# Patient Record
Sex: MALE | Race: WHITE | NOT HISPANIC OR LATINO | Employment: OTHER | URBAN - METROPOLITAN AREA
[De-identification: names, ages, dates, MRNs, and addresses within clinical notes are randomized per-mention and may not be internally consistent; named-entity substitution may affect disease eponyms.]

---

## 2023-01-03 ENCOUNTER — HOSPITAL ENCOUNTER (EMERGENCY)
Facility: HOSPITAL | Age: 73
Discharge: HOME | End: 2023-01-03
Attending: EMERGENCY MEDICINE | Admitting: EMERGENCY MEDICINE
Payer: MEDICARE

## 2023-01-03 VITALS
SYSTOLIC BLOOD PRESSURE: 144 MMHG | RESPIRATION RATE: 18 BRPM | WEIGHT: 190 LBS | DIASTOLIC BLOOD PRESSURE: 74 MMHG | HEART RATE: 90 BPM | HEIGHT: 70 IN | TEMPERATURE: 99.7 F | OXYGEN SATURATION: 96 % | BODY MASS INDEX: 27.2 KG/M2

## 2023-01-03 DIAGNOSIS — U07.1 COVID-19: Primary | ICD-10-CM

## 2023-01-03 LAB
ANION GAP SERPL CALCULATED.3IONS-SCNC: 9 MMOL/L (ref 5–15)
BUN SERPL-MCNC: 18 MG/DL (ref 8–23)
BUN/CREAT SERPL: 24 (ref 7–25)
CALCIUM SPEC-SCNC: 8.4 MG/DL (ref 8.6–10.5)
CHLORIDE SERPL-SCNC: 106 MMOL/L (ref 98–107)
CO2 SERPL-SCNC: 23 MMOL/L (ref 22–29)
CREAT SERPL-MCNC: 0.75 MG/DL (ref 0.76–1.27)
EGFRCR SERPLBLD CKD-EPI 2021: 94.7 ML/MIN/1.73
FLUAV SUBTYP SPEC NAA+PROBE: NOT DETECTED
FLUBV RNA ISLT QL NAA+PROBE: NOT DETECTED
GLUCOSE SERPL-MCNC: 124 MG/DL (ref 65–99)
POTASSIUM SERPL-SCNC: 3.9 MMOL/L (ref 3.5–5.2)
S PYO AG THROAT QL: NEGATIVE
SARS-COV-2 RNA PNL SPEC NAA+PROBE: DETECTED
SODIUM SERPL-SCNC: 138 MMOL/L (ref 136–145)

## 2023-01-03 PROCEDURE — 99283 EMERGENCY DEPT VISIT LOW MDM: CPT

## 2023-01-03 PROCEDURE — 87880 STREP A ASSAY W/OPTIC: CPT | Performed by: EMERGENCY MEDICINE

## 2023-01-03 PROCEDURE — 87081 CULTURE SCREEN ONLY: CPT | Performed by: EMERGENCY MEDICINE

## 2023-01-03 PROCEDURE — 80048 BASIC METABOLIC PNL TOTAL CA: CPT | Performed by: EMERGENCY MEDICINE

## 2023-01-03 PROCEDURE — 36415 COLL VENOUS BLD VENIPUNCTURE: CPT

## 2023-01-03 PROCEDURE — 87636 SARSCOV2 & INF A&B AMP PRB: CPT | Performed by: EMERGENCY MEDICINE

## 2023-01-03 PROCEDURE — C9803 HOPD COVID-19 SPEC COLLECT: HCPCS

## 2023-01-03 RX ORDER — ACETAMINOPHEN 500 MG
1000 TABLET ORAL ONCE
Status: COMPLETED | OUTPATIENT
Start: 2023-01-03 | End: 2023-01-03

## 2023-01-03 RX ADMIN — ACETAMINOPHEN 1000 MG: 500 TABLET ORAL at 01:40

## 2023-01-03 NOTE — ED NOTES
Pt c/o sore throat ongoing for 2 days, increasing in severity. Pt also reports taking a home covid test that came  back positive. Pt denies any known sick contacts.    Pt and staff in appropraite ppe.

## 2023-01-03 NOTE — ED PROVIDER NOTES
EMERGENCY DEPARTMENT ENCOUNTER    Room Number:  13/13  Date seen:  1/3/2023  PCP: Provider, No Known      HPI:  Chief Complaint: Sore throat  A complete HPI/ROS/PMH/PSH/SH/FH are unobtainable due to: None  Context: Renny Avila is a 72 y.o. male who presents to the ED c/o sore throat.  Onset 2 days ago with a scratchy throat.  Has become progressively worse.  It is now severe in intensity.  Constant.  Worse with eating although he is able to tolerate p.o.  He has had associated fever and rhinorrhea.  He has been vaccinated for influenza and COVID-19.  Still reliably wears a mask everywhere he goes and limits being around others.      PAST MEDICAL HISTORY  Active Ambulatory Problems     Diagnosis Date Noted   • No Active Ambulatory Problems     Resolved Ambulatory Problems     Diagnosis Date Noted   • No Resolved Ambulatory Problems     No Additional Past Medical History         PAST SURGICAL HISTORY  No past surgical history on file.      FAMILY HISTORY  No family history on file.      SOCIAL HISTORY  Social History     Socioeconomic History   • Marital status: Significant Other         ALLERGIES  Penicillins        REVIEW OF SYSTEMS  Review of Systems     All systems reviewed and negative except for those discussed in HPI.       PHYSICAL EXAM  ED Triage Vitals [01/03/23 0119]   Temp Heart Rate Resp BP SpO2   (!) 101 °F (38.3 °C) 116 18 155/84 95 %      Temp src Heart Rate Source Patient Position BP Location FiO2 (%)   Tympanic -- -- -- --       Physical Exam      GENERAL: no acute distress  HENT: Prominent nasal congestion, mild oropharyngeal erythema, uvula midline, no exudate  EYES: no scleral icterus  CV: regular rhythm, normal rate  RESPIRATORY: normal effort, clear to auscultation bilaterally  ABDOMEN: soft, nontender  MUSCULOSKELETAL: no deformity  NEURO: alert, moves all extremities, follows commands  PSYCH:  calm, cooperative  SKIN: warm, dry    Vital signs and nursing notes reviewed.          LAB  RESULTS  Recent Results (from the past 24 hour(s))   Rapid Strep A Screen - Swab, Throat    Collection Time: 01/03/23  1:42 AM    Specimen: Throat; Swab   Result Value Ref Range    Strep A Ag Negative Negative   COVID-19 and FLU A/B PCR - Swab, Nasopharynx    Collection Time: 01/03/23  1:42 AM    Specimen: Nasopharynx; Swab   Result Value Ref Range    COVID19 Detected (C) Not Detected - Ref. Range    Influenza A PCR Not Detected Not Detected    Influenza B PCR Not Detected Not Detected   Basic Metabolic Panel    Collection Time: 01/03/23  3:00 AM    Specimen: Arm, Left; Blood   Result Value Ref Range    Glucose 124 (H) 65 - 99 mg/dL    BUN 18 8 - 23 mg/dL    Creatinine 0.75 (L) 0.76 - 1.27 mg/dL    Sodium 138 136 - 145 mmol/L    Potassium 3.9 3.5 - 5.2 mmol/L    Chloride 106 98 - 107 mmol/L    CO2 23.0 22.0 - 29.0 mmol/L    Calcium 8.4 (L) 8.6 - 10.5 mg/dL    BUN/Creatinine Ratio 24.0 7.0 - 25.0    Anion Gap 9.0 5.0 - 15.0 mmol/L    eGFR 94.7 >60.0 mL/min/1.73       Ordered the above labs and reviewed the results.        RADIOLOGY  No Radiology Exams Resulted Within Past 24 Hours    Ordered the above noted radiological studies. Reviewed by me in PACS.          PROCEDURES  Procedures          MEDICATIONS GIVEN IN ER  Medications   acetaminophen (TYLENOL) tablet 1,000 mg (1,000 mg Oral Given 1/3/23 0140)           MEDICAL DECISION MAKING, PROGRESS, and CONSULTS    All labs have been independently reviewed by me.  All radiology studies have been reviewed by me and discussed with radiologist dictating the report.   EKG's independently viewed and interpreted by me.  Discussion below represents my analysis of pertinent findings related to patient's condition, differential diagnosis, treatment plan and final disposition.      Orders placed during this visit:  Orders Placed This Encounter   Procedures   • Rapid Strep A Screen - Swab, Throat   • COVID-19 and FLU A/B PCR - Swab, Nasopharynx   • Beta Strep Culture, Throat -  Swab, Throat   • Basic Metabolic Panel         Additional sources:    - External (non-ED) record review: See summary and ED course below          Differential diagnosis:    COVID-19  Influenza  Strep pharyngitis  Pneumonia    I see no evidence to suggest peritonsillar abscess or retropharyngeal abscess.  He has no trismus on exam.  He has normal phonation.  Uvula is midline.      Therefore, I do not believe that CT imaging of his neck is indicated.      Independent interpretation of labs, radiology studies, and discussions with consultants:  ED Course as of 01/03/23 0606 Tue Jan 03, 2023   0121 Temp(!): 101 °F (38.3 °C) [TD]   0121 Heart Rate: 116 [TD]   0122 On medical chart review, patient was seen by general surgery in the office on 9/10/2020.  Patient has a history of left inguinal hernia that was repaired 4 years prior.  He has noticed bulging in the right groin over the past few months leading up to his visit.  Plan was for da [facility] right inguinal hernia repair with mesh. [TD]   0203 Strep A Ag: Negative [TD]   0234 COVID19(!!): Detected [TD]      ED Course User Index  [TD] Will Landry II, MD       Patient has normal oxygenation at rest.  Vital signs have improved, specifically his heart rate compared to his triage.  Lungs are clear.  I do not believe that chest x-ray is clinically decayed at this time either.  I gave him good return precautions.  We discussed risks and benefits of Paxlovid. I have sent this to his pharmacy.    Patient has normal kidney function. Paxlovid appropriately dosed.         PPE: The patient wore a mask throughout the entire encounter. I wore a well-fitting mask.    DIAGNOSIS  Final diagnoses:   COVID-19         DISPOSITION  DISCHARGE    FOLLOW-UP  [facility]  [address] [facility] [address] [phone]  Go to   If symptoms worsen         Medication List      New Prescriptions    Nirmatrelvir&Ritonavir 300/100 20 x 150  MG & 10 x 100MG tablet therapy pack tablet  Commonly known as: PAXLOVID  Take 3 tablets by mouth 2 (Two) Times a Day for 5 days.           Where to Get Your Medications      These medications were sent to [id number] - [place], [place] - [address] AT [address] - [phone]  - [phone] FX  [address] [address]    Phone: [phone]   · Nirmatrelvir&Ritonavir 300/100 20 x 150 MG & 10 x 100MG tablet therapy pack tablet             Latest Documented Vital Signs:  As of 06:06 EST  BP- 144/74 HR- 90 Temp- 99.7 °F (37.6 °C) (Oral) O2 sat- 96%      --    Please note that portions of this were completed with a voice recognition program.       Note Disclaimer: At [facility], we believe that sharing information builds trust and better relationships. You are receiving this note because you are receiving care at [facility] or recently visited. It is possible you will see health information before a provider has talked with you about it. This kind of information can be easy to misunderstand. To help you fully understand what it means for your health, we urge you to discuss this note with your provider.       Will Landry II, MD  01/03/23 0606

## 2023-01-05 LAB — BACTERIA SPEC AEROBE CULT: NORMAL

## 2023-03-06 ENCOUNTER — TRANSCRIBE ORDERS (OUTPATIENT)
Dept: ADMINISTRATIVE | Facility: HOSPITAL | Age: 73
End: 2023-03-06
Payer: MEDICARE

## 2023-03-06 DIAGNOSIS — M54.12 CERVICAL RADICULOPATHY: Primary | ICD-10-CM
